# Patient Record
Sex: FEMALE | Race: BLACK OR AFRICAN AMERICAN | NOT HISPANIC OR LATINO | Employment: OTHER | URBAN - METROPOLITAN AREA
[De-identification: names, ages, dates, MRNs, and addresses within clinical notes are randomized per-mention and may not be internally consistent; named-entity substitution may affect disease eponyms.]

---

## 2017-01-05 PROBLEM — T82.858A STENOSIS OF ARTERIOVENOUS DIALYSIS FISTULA: Status: ACTIVE | Noted: 2017-01-05

## 2017-01-09 DIAGNOSIS — N18.6 ESRD (END STAGE RENAL DISEASE): Primary | ICD-10-CM

## 2017-01-09 RX ORDER — HYDRALAZINE HYDROCHLORIDE 50 MG/1
50 TABLET, FILM COATED ORAL EVERY 8 HOURS
Qty: 90 TABLET | Refills: 11
Start: 2017-01-09 | End: 2018-01-09

## 2017-01-30 PROBLEM — R74.8 ELEVATED LIVER ENZYMES: Status: ACTIVE | Noted: 2017-01-30

## 2017-01-30 PROBLEM — Z16.21 VRE BACTEREMIA: Status: ACTIVE | Noted: 2017-01-30

## 2017-01-30 PROBLEM — R78.81 GRAM-NEGATIVE BACTEREMIA: Status: ACTIVE | Noted: 2017-01-30

## 2017-01-30 PROBLEM — R78.81 VRE BACTEREMIA: Status: ACTIVE | Noted: 2017-01-30

## 2017-01-30 PROBLEM — B95.2 VRE BACTEREMIA: Status: ACTIVE | Noted: 2017-01-30

## 2017-02-03 ENCOUNTER — TELEPHONE (OUTPATIENT)
Dept: GASTROENTEROLOGY | Facility: CLINIC | Age: 72
End: 2017-02-03

## 2017-02-03 ENCOUNTER — ANESTHESIA (OUTPATIENT)
Dept: ENDOSCOPY | Facility: HOSPITAL | Age: 72
End: 2017-02-03

## 2017-02-03 ENCOUNTER — ANESTHESIA EVENT (OUTPATIENT)
Dept: ENDOSCOPY | Facility: HOSPITAL | Age: 72
End: 2017-02-03

## 2017-02-03 ENCOUNTER — SURGERY (OUTPATIENT)
Age: 72
End: 2017-02-03

## 2017-02-03 DIAGNOSIS — K62.5 RECTAL BLEEDING: Primary | ICD-10-CM

## 2017-02-03 PROCEDURE — 63600175 PHARM REV CODE 636 W HCPCS: Performed by: NURSE ANESTHETIST, CERTIFIED REGISTERED

## 2017-02-03 PROCEDURE — 25000003 PHARM REV CODE 250: Performed by: NURSE ANESTHETIST, CERTIFIED REGISTERED

## 2017-02-03 RX ORDER — PHENYLEPHRINE HYDROCHLORIDE 10 MG/ML
INJECTION INTRAVENOUS
Status: DISCONTINUED | OUTPATIENT
Start: 2017-02-03 | End: 2017-02-03

## 2017-02-03 RX ORDER — PROPOFOL 10 MG/ML
VIAL (ML) INTRAVENOUS
Status: DISCONTINUED | OUTPATIENT
Start: 2017-02-03 | End: 2017-02-03

## 2017-02-03 RX ORDER — LIDOCAINE HCL/PF 100 MG/5ML
SYRINGE (ML) INTRAVENOUS
Status: DISCONTINUED | OUTPATIENT
Start: 2017-02-03 | End: 2017-02-03

## 2017-02-03 RX ADMIN — LIDOCAINE HYDROCHLORIDE 100 MG: 20 INJECTION, SOLUTION INTRAVENOUS at 05:02

## 2017-02-03 RX ADMIN — PHENYLEPHRINE HYDROCHLORIDE 100 MCG: 10 INJECTION INTRAVENOUS at 05:02

## 2017-02-03 RX ADMIN — PROPOFOL 30 MG: 10 INJECTION, EMULSION INTRAVENOUS at 05:02

## 2017-02-03 NOTE — ANESTHESIA PREPROCEDURE EVALUATION
02/03/2017  Annabella Goodrich is a 71 y.o. female HTN, DM II, ESRD on MWF HD, status epilepticus, CVA, h/o bilateral DVTs/p IVC filter placement and asthma. Trach/peg in place. Receiving 2u pRBC today for Hgb 6.5     Last HD thursday 2/2/17      LDA:  PEG tube            8.0 shiley cuffed trach         [facility] Anesthesia Evaluation    I have reviewed the Patient Summary Reports.     I have reviewed the Medications.     Review of Systems  Anesthesia Hx:  No problems with previous Anesthesia Denies Hx of Anesthetic complications  History of prior surgery of interest to airway management or planning: Previous anesthesia: General Denies Family Hx of Anesthesia complications.   Denies Personal Hx of Anesthesia complications.   Social:  Former Smoker, No Alcohol Use    Hematology/Oncology:         -- Anemia:   EENT/Dental:   chronic allergic rhinitis   Cardiovascular:   Hypertension, poorly controlled Denies MI.  Denies CAD.    PVD H/o prolonged QT interval   Pulmonary:   Asthma mild    Renal/:   Chronic Renal Disease, ESRD, Dialysis    Hepatic/GI:  Hepatic/GI Normal    Neurological:   CVA, residual symptoms Seizures    Endocrine:   Diabetes, poorly controlled, type 2 Hypothyroidism        Physical Exam  General:  Malnutrition    Airway/Jaw/Neck:  Airway Findings: Pre-Existing Airway Tube(s): Tracheostomy tube General Airway Assessment: Adult       Chest/Lungs:  Chest/Lungs Findings: Clear to auscultation, Normal Respiratory Rate     Heart/Vascular:  Heart Findings: Rate: Normal  Rhythm: Regular Rhythm  Sounds: Normal           Lab Results   Component Value Date    WBC 12.27 02/03/2017    HGB 6.5 (L) 02/03/2017    HCT 22.1 (L) 02/03/2017     02/03/2017    CHOL 205 (H) 11/20/2016    TRIG 239 (H) 11/20/2016    HDL 56 11/20/2016    ALT 52 (H) 02/03/2017    AST 56 (H) 02/03/2017     (L) 02/03/2017    K 3.2 (L)  02/03/2017    CL 97 02/03/2017    CREATININE 2.0 (H) 02/03/2017    BUN 15 02/03/2017    CO2 20 (L) 02/03/2017    TSH 0.274 (L) 12/04/2016    INR 1.0 12/31/2016    HGBA1C 11.2 (H) 11/18/2016     Normal sinus rhythm  Left axis deviation  LVH with secondary ST-T wave changes  Abnormal ECG  When compared with ECG of 01-JAN-2017 05:38,  No significant change was found  Confirmed by Kate Winston MD ([id number]) on 2/1/2017 6:04:18 AM    Referred By: CAREY DE LEON           Confirmed By:Kate Winston MD    CONCLUSIONS ECHO    1 - Normal left ventricular systolic function (EF 55-60%).     2 - Left ventricular diastolic dysfunction.     3 - Moderate left atrial enlargement.     4 - Concentric hypertrophy.     5 - Normal right ventricular systolic function .   Calcified and sclerotic valves, but no vegetation seen.        This document has been electronically    SIGNED BY: Efrem Kingston MD On: 01/28/2017 10:20               Anesthesia Plan  Type of Anesthesia, risks & benefits discussed:  Anesthesia Type:  general, MAC  Patient's Preference:   Intra-op Monitoring Plan:   Intra-op Monitoring Plan Comments:   Post Op Pain Control Plan:   Post Op Pain Control Plan Comments:   Induction:   IV  Beta Blocker:  Patient is on a Beta-Blocker and has received one dose within the past 24 hours (No further documentation required).       Informed Consent: Patient representative understands risks and agrees with Anesthesia plan.  Questions answered. Anesthesia consent signed with patient representative.  ASA Score: 4     Day of Surgery Review of History & Physical:            Ready For Surgery From Anesthesia Perspective.

## 2017-02-03 NOTE — TRANSFER OF CARE
"Anesthesia Transfer of Care Note    Patient: Annabella Goodrich    Procedure(s) Performed: Procedure(s) (LRB):  SIGMOIDOSCOPY-FLEXIBLE- LTAC (N/A)  ESOPHAGOGASTRODUODENOSCOPY (EGD) (N/A)  COLONOSCOPY (N/A)    Patient location: PACU    Anesthesia Type: MAC    Transport from OR: Transported from OR on room air with adequate spontaneous ventilation    Post pain: adequate analgesia    Post assessment: no apparent anesthetic complications    Post vital signs: stable    Level of consciousness: awake and alert    Nausea/Vomiting: no nausea/vomiting    Complications: none          Last vitals:   Visit Vitals    BP (!) 108/43 (BP Location: Left leg, Patient Position: Lying, BP Method: Automatic)    Pulse 62    Temp 37.1 °C (98.7 °F) (Oral)    Resp 18    Ht 5' 3" (1.6 m)    Wt 65.3 kg (144 lb)    LMP  (LMP Unknown)    SpO2 99%    Breastfeeding No    BMI 25.51 kg/m2     "

## 2017-02-04 NOTE — ANESTHESIA POSTPROCEDURE EVALUATION
"Anesthesia Post Evaluation    Patient: Annabella Goodrich    Procedure(s) Performed: Procedure(s) (LRB):  SIGMOIDOSCOPY-FLEXIBLE- LTAC (N/A)  ESOPHAGOGASTRODUODENOSCOPY (EGD) (N/A)  COLONOSCOPY (N/A)    Final Anesthesia Type: MAC  Patient location during evaluation: PACU  Patient participation: Yes- Able to Participate  Level of consciousness: awake and alert  Post-procedure vital signs: reviewed and stable  Pain management: adequate  Airway patency: patent  PONV status at discharge: No PONV  Anesthetic complications: no      Cardiovascular status: hemodynamically stable  Respiratory status: unassisted  Hydration status: euvolemic  Follow-up not needed.        Visit Vitals    BP (!) 98/59    Pulse 71    Temp 36.9 °C (98.5 °F)    Resp 20    Ht 5' 3" (1.6 m)    Wt 65.3 kg (144 lb)    LMP  (LMP Unknown)    SpO2 98%    Breastfeeding No    BMI 25.51 kg/m2       Pain/Marialuisa Score: Pain Assessment Performed: Yes (2/3/2017  5:35 PM)  Presence of Pain: denies (2/3/2017  5:35 PM)      "

## 2017-02-09 PROBLEM — K80.12 CALCULUS OF GALLBLADDER WITH ACUTE ON CHRONIC CHOLECYSTITIS: Status: ACTIVE | Noted: 2017-01-30

## 2017-02-14 DIAGNOSIS — K92.2 GASTROINTESTINAL HEMORRHAGE, UNSPECIFIED GASTROINTESTINAL HEMORRHAGE TYPE: Primary | ICD-10-CM

## 2017-02-16 DIAGNOSIS — K80.50 CHOLEDOCHOLITHIASIS: Primary | ICD-10-CM

## 2017-02-16 DIAGNOSIS — R17 ELEVATED BILIRUBIN: ICD-10-CM

## 2017-02-16 DIAGNOSIS — K92.2 GASTROINTESTINAL HEMORRHAGE, UNSPECIFIED GASTROINTESTINAL HEMORRHAGE TYPE: ICD-10-CM

## 2017-02-17 ENCOUNTER — ANESTHESIA (OUTPATIENT)
Dept: ENDOSCOPY | Facility: HOSPITAL | Age: 72
End: 2017-02-17

## 2017-02-17 ENCOUNTER — ANESTHESIA EVENT (OUTPATIENT)
Dept: ENDOSCOPY | Facility: HOSPITAL | Age: 72
End: 2017-02-17

## 2017-02-17 ENCOUNTER — SURGERY (OUTPATIENT)
Age: 72
End: 2017-02-17

## 2017-02-17 PROCEDURE — 25000003 PHARM REV CODE 250: Performed by: NURSE ANESTHETIST, CERTIFIED REGISTERED

## 2017-02-17 PROCEDURE — 63600175 PHARM REV CODE 636 W HCPCS: Performed by: NURSE ANESTHETIST, CERTIFIED REGISTERED

## 2017-02-17 RX ORDER — ONDANSETRON 2 MG/ML
INJECTION INTRAMUSCULAR; INTRAVENOUS
Status: DISCONTINUED | OUTPATIENT
Start: 2017-02-17 | End: 2017-02-17

## 2017-02-17 RX ORDER — SODIUM CHLORIDE 9 MG/ML
INJECTION, SOLUTION INTRAVENOUS CONTINUOUS PRN
Status: DISCONTINUED | OUTPATIENT
Start: 2017-02-17 | End: 2017-02-17

## 2017-02-17 RX ADMIN — SODIUM CHLORIDE: 0.9 INJECTION, SOLUTION INTRAVENOUS at 04:02

## 2017-02-17 RX ADMIN — ONDANSETRON 8 MG: 2 INJECTION, SOLUTION INTRAMUSCULAR; INTRAVENOUS at 05:02

## 2017-02-17 NOTE — TRANSFER OF CARE
Anesthesia Transfer of Care Note    Patient: Annabella Goodrich    Procedure(s) Performed: Procedure(s) (LRB):  ERCP- LTAC  (N/A)  ESOPHAGOGASTRODUODENOSCOPY (EGD) with video capsule placement in the duodenum (N/A)    Patient location: PACU    Anesthesia Type: general    Transport from OR: Transported from OR on 6-10 L/min O2 by face mask with adequate spontaneous ventilation    Post pain: adequate analgesia    Post assessment: no apparent anesthetic complications and tolerated procedure well    Post vital signs: stable    Level of consciousness: awake and alert    Nausea/Vomiting: no nausea/vomiting    Complications: none          Last vitals:   Visit Vitals    BP (!) 187/76    Pulse 73    Temp 37.8 °C (100 °F) (Axillary)    Resp 16    LMP  (LMP Unknown)    SpO2 97%    Breastfeeding No

## 2017-02-17 NOTE — ANESTHESIA PREPROCEDURE EVALUATION
02/17/2017  Annabella Goodrich is a 71 y.o. female LTAC patient w hx GI bleedfor EGD & ERCP 2017-02-17    PRIOR ANES  2015-02-03 EGD MAC prop 30, phenyleph 100; [facility]  2016-12-19 Colonoscopy MAC prop [id number] [id number] (sbp 80-90) [facility]    ANES-RELATED MED/SURG 2017-02-17  HTN  DM II  ESRD on MWF HD  Hx status epilepticus  Hx CVA   - dementia (oriented to name, not place)  Hx  DVT (R+L)   - s/p IVC filter  Asthma  Trach present, but capped off; breathing on nasal cannula  PEG tube in place; needs re-positioning  Anemia   - s/p pRBC transfusion     ANES-RELATED MAR 2017-02-03 (See LTAC chart for update)  Amlodipine Albuterol Insulin-asart Pantoprazole keppra amantidine  Captopril  Carvedilol  hydralazine  ASA      [facility] Anesthesia Evaluation      I have reviewed the Medications.     Review of Systems  Anesthesia Hx:  No problems with previous Anesthesia Denies Hx of Anesthetic complications History of prior surgery of interest to airway management or planning: Previous anesthesia: General Denies Family Hx of Anesthesia complications.   Denies Personal Hx of Anesthesia complications.   Social:  Former Smoker, No Alcohol Use    Hematology/Oncology:         -- Anemia:   EENT/Dental:   chronic allergic rhinitis   Cardiovascular:   Hypertension, poorly controlled Denies MI.  Denies CAD.    PVD H/o prolonged QT interval   Pulmonary:   Asthma mild    Renal/:   Chronic Renal Disease, ESRD, Dialysis    Hepatic/GI:  Hepatic/GI Normal    Neurological:   CVA, residual symptoms Seizures    Endocrine:   Diabetes, poorly controlled, type 2 Hypothyroidism    latest V/S on LTAC chart    Physical Exam  General:  Malnutrition    Airway/Jaw/Neck:  Airway Findings: Pre-Existing Airway Tube(s): Tracheostomy tube General Airway Assessment: Adult       Chest/Lungs:  Chest/Lungs Findings: Clear to auscultation, Normal Respiratory Rate      Heart/Vascular:  Heart Findings: Rate: Normal  Rhythm: Regular Rhythm  Sounds: Normal           Lab Results   Component Value Date    WBC 13.43 (H) 02/17/2017    HGB 8.4 (L) 02/17/2017    HCT 25.4 (L) 02/17/2017    MCV 90 02/17/2017     02/17/2017       Chemistry        Component Value Date/Time     (L) 02/17/2017 0816    K 3.3 (L) 02/17/2017 0816    CL 98 02/17/2017 0816    CO2 24 02/17/2017 0816    BUN 23 02/17/2017 0816    CREATININE 1.8 (H) 02/17/2017 0816     (H) 02/17/2017 0816        Component Value Date/Time    CALCIUM 9.6 02/17/2017 0816    ALKPHOS 1090 (H) 02/10/2017 0454    AST 89 (H) 02/10/2017 0454    ALT 30 02/10/2017 0454    BILITOT 9.8 (H) 02/10/2017 0454        Lab Results   Component Value Date    ALBUMIN 1.7 (L) 02/16/2017    ALBUMIN 1.7 (L) 02/16/2017       CXR 2017-02-15  Cardiac silhouette is enlarged similar to prior exam.    Calcification of the aortic arch.    Tracheostomy noted, unchanged.    Interval increase in patchy interstitial lung markings scattered throughout the lungs bilaterally which may be seen with pulmonary edema or multifocal pneumonia.    No pleural effusion.  Osseous structures are unremarkable.    Right-sided catheter is visualized in the right upper extremity, with its tip terminating in the region of the right axillary vein. .    Stent in the left subclavian region.    EKG 2017-01-28  Normal sinus rhythm  Left axis deviation  LVH with secondary ST-T wave changes  Abnormal ECG  When compared with ECG of 01-JAN-2017 05:38,  No significant change was found  Confirmed by Catrachito    ECHO 2017-01-28    1 - Normal left ventricular systolic function (EF 55-60%).     2 - Left ventricular diastolic dysfunction.     3 - Moderate left atrial enlargement.     4 - Concentric hypertrophy.     5 - Normal right ventricular systolic function .   Calcified and sclerotic valves, but no vegetation seen.  This document has been electronically    SIGNED BY: Efrem  Thee       Anesthesia Plan  Type of Anesthesia, risks & benefits discussed:  Anesthesia Type:  general, MAC  Patient's Preference:   Intra-op Monitoring Plan:   Intra-op Monitoring Plan Comments:   Post Op Pain Control Plan:   Post Op Pain Control Plan Comments:   Induction:   IV  Beta Blocker:  Patient is on a Beta-Blocker and has received one dose within the past 24 hours (No further documentation required).       Informed Consent: Patient representative understands risks and agrees with Anesthesia plan.  Questions answered. Anesthesia consent signed with patient representative.  ASA Score: 4     Day of Surgery Review of History & Physical:        Anesthesia Plan Notes: 2017-02-17   - case was canceled 2d ago; re-booked for today   - family member with patient     ~ asks that PEG tube be repositioned during procedure (presently in wrong position)   - resp there today says more crackles in lungs than yesterday   - febrile 100.7   - today  systolic   - glucose today 247 @ 08:16 AM        Ready For Surgery From Anesthesia Perspective.

## 2017-02-18 NOTE — ANESTHESIA POSTPROCEDURE EVALUATION
Anesthesia Post Evaluation    Patient: Annabella Goodrich    Procedure(s) Performed: Procedure(s) (LRB):  ERCP- LTAC  (N/A)  ESOPHAGOGASTRODUODENOSCOPY (EGD) with video capsule placement in the duodenum (N/A)    Final Anesthesia Type: general  Patient location during evaluation: PACU  Patient participation: Yes- Able to Participate  Level of consciousness: awake and alert (at baseline)  Post-procedure vital signs: reviewed and stable  Pain management: adequate  Airway patency: patent  PONV status at discharge: No PONV  Anesthetic complications: no      Cardiovascular status: blood pressure returned to baseline and hemodynamically stable  Respiratory status: unassisted, spontaneous ventilation and room air  Hydration status: euvolemic  Follow-up not needed.        Visit Vitals    BP (!) 151/59    Pulse 66    Temp 37.2 °C (99 °F) (Oral)    Resp 20    LMP  (LMP Unknown)    SpO2 96%    Breastfeeding No       Pain/Marialuisa Score: Pain Assessment Performed: Yes (2/17/2017  5:40 PM)  Presence of Pain: denies (2/17/2017  6:10 PM)  Marialuisa Score: 8 (2/17/2017  6:10 PM)

## 2017-02-18 NOTE — ANESTHESIA RELEASE NOTE
Anesthesia Release from PACU Note    Patient: Annabella Goodrich    Procedure(s) Performed: Procedure(s) (LRB):  ERCP- LTAC  (N/A)  ESOPHAGOGASTRODUODENOSCOPY (EGD) with video capsule placement in the duodenum (N/A)    Anesthesia type: general    Post pain: Adequate analgesia    Post assessment: no apparent anesthetic complications    Last Vitals:   Visit Vitals    BP (!) 151/59    Pulse 66    Temp 37.2 °C (99 °F) (Oral)    Resp 20    LMP  (LMP Unknown)    SpO2 96%    Breastfeeding No       Post vital signs: stable    Level of consciousness: awake and alert  at baseline    Nausea/Vomiting: no nausea/no vomiting    Complications: none    Airway Patency: patent    Respiratory: unassisted, spontaneous ventilation, room air    Cardiovascular: stable and blood pressure at baseline    Hydration: euvolemic

## 2017-02-22 DIAGNOSIS — J18.9 PNEUMONIA: ICD-10-CM

## 2017-02-22 DIAGNOSIS — I77.0 A-V FISTULA: Primary | ICD-10-CM

## 2017-02-22 DIAGNOSIS — T82.898A AV FISTULA OCCLUSION: ICD-10-CM

## 2017-03-02 DIAGNOSIS — K94.23 MALFUNCTION OF PERCUTANEOUS ENDOSCOPIC GASTROSTOMY (PEG) TUBE: Primary | ICD-10-CM

## 2017-03-03 ENCOUNTER — ANESTHESIA (OUTPATIENT)
Dept: SURGERY | Facility: HOSPITAL | Age: 72
End: 2017-03-03

## 2017-03-03 ENCOUNTER — ANESTHESIA EVENT (OUTPATIENT)
Dept: ENDOSCOPY | Facility: HOSPITAL | Age: 72
End: 2017-03-03

## 2017-03-03 ENCOUNTER — SURGERY (OUTPATIENT)
Age: 72
End: 2017-03-03

## 2017-03-03 ENCOUNTER — ANESTHESIA EVENT (OUTPATIENT)
Dept: SURGERY | Facility: HOSPITAL | Age: 72
End: 2017-03-03

## 2017-03-03 ENCOUNTER — ANESTHESIA (OUTPATIENT)
Dept: ENDOSCOPY | Facility: HOSPITAL | Age: 72
End: 2017-03-03

## 2017-03-03 PROBLEM — L89.90 INFECTED DECUBITUS ULCER: Status: ACTIVE | Noted: 2017-03-03

## 2017-03-03 PROBLEM — L89.104 DECUBITUS ULCER OF BACK, STAGE 4: Status: ACTIVE | Noted: 2017-03-03

## 2017-03-03 PROBLEM — L08.9 INFECTED DECUBITUS ULCER: Status: ACTIVE | Noted: 2017-03-03

## 2017-03-03 LAB — PERIPHERAL STENOSIS: ABNORMAL

## 2017-03-03 PROCEDURE — 63600175 PHARM REV CODE 636 W HCPCS: Performed by: NURSE ANESTHETIST, CERTIFIED REGISTERED

## 2017-03-03 PROCEDURE — 25000003 PHARM REV CODE 250: Performed by: INTERNAL MEDICINE

## 2017-03-03 RX ORDER — CEFAZOLIN SODIUM 1 G/3ML
INJECTION, POWDER, FOR SOLUTION INTRAMUSCULAR; INTRAVENOUS
Status: DISCONTINUED | OUTPATIENT
Start: 2017-03-03 | End: 2017-03-03

## 2017-03-03 RX ORDER — PROPOFOL 10 MG/ML
VIAL (ML) INTRAVENOUS CONTINUOUS PRN
Status: DISCONTINUED | OUTPATIENT
Start: 2017-03-03 | End: 2017-03-03

## 2017-03-03 RX ORDER — PROPOFOL 10 MG/ML
VIAL (ML) INTRAVENOUS
Status: DISCONTINUED | OUTPATIENT
Start: 2017-03-03 | End: 2017-03-03

## 2017-03-03 RX ADMIN — PROPOFOL 50 MCG/KG/MIN: 10 INJECTION, EMULSION INTRAVENOUS at 03:03

## 2017-03-03 RX ADMIN — PROPOFOL 20 MG: 10 INJECTION, EMULSION INTRAVENOUS at 01:03

## 2017-03-03 RX ADMIN — PROPOFOL 10 MG: 10 INJECTION, EMULSION INTRAVENOUS at 01:03

## 2017-03-03 RX ADMIN — PROPOFOL 30 MG: 10 INJECTION, EMULSION INTRAVENOUS at 01:03

## 2017-03-03 RX ADMIN — BACITRACIN 50000 UNITS: 5000 INJECTION, POWDER, FOR SOLUTION INTRAMUSCULAR at 04:03

## 2017-03-03 RX ADMIN — SODIUM CHLORIDE: 0.9 INJECTION, SOLUTION INTRAVENOUS at 12:03

## 2017-03-03 RX ADMIN — LIDOCAINE HYDROCHLORIDE 20 ML: 10 INJECTION, SOLUTION EPIDURAL; INFILTRATION; INTRACAUDAL; PERINEURAL at 04:03

## 2017-03-03 RX ADMIN — CEFAZOLIN 1 G: 330 INJECTION, POWDER, FOR SOLUTION INTRAMUSCULAR; INTRAVENOUS at 03:03

## 2017-03-03 RX ADMIN — CEFAZOLIN 1 G: 330 INJECTION, POWDER, FOR SOLUTION INTRAMUSCULAR; INTRAVENOUS at 01:03

## 2017-03-03 NOTE — ANESTHESIA PREPROCEDURE EVALUATION
03/03/2017     Annabella Goodrich is a 71 y.o. female LTAC patient scheduled for debridement of sacral ulcer.  Had EGD with PEG placement under MAC earlier this afternoon with no issues.     There were no vitals filed for this visit.  PRIOR ANES in  Epic  2015-02-03 EGD MAC prop 30, phenyleph 100; [facility]  2016-12-19 Colonoscopy MAC prop [id number] VSS (sbp 80-90) [facility]    ANES-RELATED MED/SURG 2017-02-17  HTN  DM II  Carotid stenois, bilateral  ESRD on MWF HD  Hx status epilepticus  Hx CVA   - dementia (oriented to name, not place)  Hx seizure disorder  Hx  DVT (R+L)   - s/p IVC filter  Asthma  Trach present, but capped off; breathing on nasal cannula  PEG tube in place  Anemia   - s/p pRBC transfusion    Past Surgical History:   Procedure Laterality Date    AV FISTULA PLACEMENT      COLONOSCOPY N/A 12/19/2016    Procedure: COLONOSCOPY;  Surgeon: Andrew Duque MD;  Location: [facility] ([address] [place] [address]);  Service: Endoscopy;  Laterality: N/A;    COLONOSCOPY N/A 2/3/2017    Procedure: COLONOSCOPY;  Surgeon: Florencio Nunez Jr., MD;  Location: [facility];  Service: Endoscopy;  Laterality: N/A;    HERNIA REPAIR      HYSTERECTOMY      TRACHEOSTOMY TUBE PLACEMENT          ANES-RELATED Home Rx 2017-02-03  Amlodipine Albuterol Insulin-asart Pantoprazole keppra amantidine  Captopril  Carvedilol  hydralazine  ASA-81      [facility] Anesthesia Evaluation    I have reviewed the Patient Summary Reports.    I have reviewed the Nursing Notes.   I have reviewed the Medications.     Review of Systems  Anesthesia Hx:  No problems with previous Anesthesia Denies Hx of Anesthetic complications  History of prior surgery of interest to airway management or planning: Previous anesthesia: General Denies Family Hx of Anesthesia complications.   Denies Personal Hx of Anesthesia complications.   Social:  Former Smoker, No Alcohol Use     Hematology/Oncology:         -- Anemia:   EENT/Dental:   chronic allergic rhinitis   Cardiovascular:   Hypertension, poorly controlled Denies MI.  Denies CAD.    PVD H/o prolonged QT interval   Pulmonary:   Asthma mild    Renal/:   Chronic Renal Disease, ESRD, Dialysis    Hepatic/GI:  Hepatic/GI Normal    Neurological:   CVA, residual symptoms Seizures    Endocrine:   Diabetes, poorly controlled, type 2 Hypothyroidism      Wt Readings from Last 3 Encounters:   02/03/17 65.3 kg (144 lb)   12/29/16 70 kg (154 lb 5.2 oz)   11/19/16 65.5 kg (144 lb 6.4 oz)     Temp Readings from Last 3 Encounters:   03/02/17 36.8 °C (98.2 °F) (Axillary)   03/03/17 36.7 °C (98.1 °F) (Axillary)   03/03/17 37.2 °C (99 °F) (Oral)     BP Readings from Last 3 Encounters:   03/02/17 138/67   03/03/17 (!) 144/65   03/03/17 (!) 170/73     Pulse Readings from Last 3 Encounters:   03/02/17 (!) 58   03/03/17 62   03/03/17 63       Physical Exam  General:  Malnutrition    Airway/Jaw/Neck:  Airway Findings: Pre-Existing Airway Tube(s): Tracheostomy tube General Airway Assessment: Adult       Chest/Lungs:  Chest/Lungs Findings: Clear to auscultation, Normal Respiratory Rate     Heart/Vascular:  Heart Findings: Rate: Normal  Rhythm: Regular Rhythm  Sounds: Normal           Lab Results   Component Value Date    WBC 17.59 (H) 03/02/2017    HGB 9.1 (L) 03/02/2017    HCT 29.2 (L) 03/02/2017    MCV 94 03/02/2017     03/02/2017       Chemistry        Component Value Date/Time     (L) 03/02/2017 0500     (L) 03/02/2017 0500    K 4.7 03/02/2017 0500    K 4.7 03/02/2017 0500    CL 87 (L) 03/02/2017 0500    CL 87 (L) 03/02/2017 0500    CO2 24 03/02/2017 0500    CO2 24 03/02/2017 0500    BUN 62 (H) 03/02/2017 0500    BUN 62 (H) 03/02/2017 0500    CREATININE 2.6 (H) 03/02/2017 0500    CREATININE 2.6 (H) 03/02/2017 0500     (HH) 03/02/2017 0500     () 03/02/2017 0500        Component Value Date/Time    CALCIUM 11.3 (H)  03/02/2017 0500    CALCIUM 11.3 (H) 03/02/2017 0500    ALKPHOS 1090 (H) 02/10/2017 0454    AST 89 (H) 02/10/2017 0454    ALT 30 02/10/2017 0454    BILITOT 9.8 (H) 02/10/2017 0454        Lab Results   Component Value Date    ALBUMIN 1.4 (L) 03/02/2017    ALBUMIN 1.4 (L) 03/02/2017       CXR 2017-02-15  Cardiac silhouette is enlarged similar to prior exam.    Calcification of the aortic arch.    Tracheostomy noted, unchanged.    Interval increase in patchy interstitial lung markings scattered throughout the lungs bilaterally which may be seen with pulmonary edema or multifocal pneumonia.    No pleural effusion.  Osseous structures are unremarkable.    Right-sided catheter is visualized in the right upper extremity, with its tip terminating in the region of the right axillary vein. .    Stent in the left subclavian region.    EKG 2017-01-28  Normal sinus rhythm  Left axis deviation  LVH with secondary ST-T wave changes  Abnormal ECG  When compared with ECG of 01-JAN-2017 05:38,  No significant change was found  Confirmed by Catrachito    ECHO 2017-01-28    1 - Normal left ventricular systolic function (EF 55-60%).     2 - Left ventricular diastolic dysfunction.     3 - Moderate left atrial enlargement.     4 - Concentric hypertrophy.     5 - Normal right ventricular systolic function .   Calcified and sclerotic valves, but no vegetation seen.  This document has been electronically    SIGNED BY: Efrem Kingston       Anesthesia Plan  Type of Anesthesia, risks & benefits discussed:  Anesthesia Type:  general, MAC  Patient's Preference:   Intra-op Monitoring Plan:   Intra-op Monitoring Plan Comments:   Post Op Pain Control Plan:   Post Op Pain Control Plan Comments:   Induction:   IV  Beta Blocker:  Patient is on a Beta-Blocker and has received one dose within the past 24 hours (No further documentation required).       Informed Consent: Patient representative understands risks and agrees with Anesthesia plan.  Questions  answered. Anesthesia consent signed with patient representative.  ASA Score: 4     Day of Surgery Review of History & Physical: I have interviewed and examined the patient. I have reviewed the patient's H&P dated:  There are no significant changes.      Anesthesia Plan Notes: Phone consent obtained with patient's daughter Melodie Borges.         Ready For Surgery From Anesthesia Perspective.

## 2017-03-03 NOTE — ANESTHESIA POSTPROCEDURE EVALUATION
Anesthesia Post Evaluation    Patient: Annabella Goodrich    Procedure(s) Performed: Procedure(s) (LRB):  DEBRIDEMENT-WOUND SACRAL (N/A)    Final Anesthesia Type: MAC  Patient location during evaluation: PACU  Patient participation: Yes- Able to Participate  Level of consciousness: awake and alert  Post-procedure vital signs: reviewed and stable  Pain management: adequate  Airway patency: patent  PONV status at discharge: No PONV  Anesthetic complications: no      Cardiovascular status: blood pressure returned to baseline and hemodynamically stable  Respiratory status: unassisted, spontaneous ventilation and room air  Hydration status: euvolemic  Follow-up not needed.        Visit Vitals    BP (!) 155/72    Pulse 64    Temp 36.7 °C (98 °F) (Oral)    Resp (!) 21    LMP  (LMP Unknown)    SpO2 100%       Pain/Marialuisa Score: Pain Assessment Performed: Yes (3/3/2017  4:50 PM)  Presence of Pain: denies (3/3/2017  4:50 PM)  Marialuisa Score: 8 (3/3/2017  4:50 PM)

## 2017-03-03 NOTE — ANESTHESIA RELEASE NOTE
Anesthesia Release from PACU Note    Patient: Annabella Goodrich    Procedure(s) Performed: Procedure(s) (LRB):  ESOPHAGOGASTRODUODENOSCOPY (EGD) with PEG- LTAC (N/A)    Anesthesia type: MAC    Post pain: Adequate analgesia    Post assessment: no apparent anesthetic complications, tolerated procedure well and no evidence of recall    Last Vitals:   Visit Vitals    BP (!) 144/65    Pulse 62    Temp 36.7 °C (98.1 °F) (Axillary)    Resp (!) 21    LMP  (LMP Unknown)    SpO2 99%       Post vital signs: stable    Level of consciousness: awake, alert  and oriented    Nausea/Vomiting: no nausea/no vomiting    Complications: none    Airway Patency: patent    Respiratory: unassisted    Cardiovascular: stable and blood pressure at baseline    Hydration: euvolemic

## 2017-03-03 NOTE — ANESTHESIA POSTPROCEDURE EVALUATION
Anesthesia Post Evaluation    Patient: Annabella Goodrich    Procedure(s) Performed: Procedure(s) (LRB):  ESOPHAGOGASTRODUODENOSCOPY (EGD) with PEG- LTAC (N/A)    Final Anesthesia Type: MAC  Patient location during evaluation: PACU  Patient participation: Yes- Able to Participate  Level of consciousness: awake and alert  Post-procedure vital signs: reviewed and stable  Pain management: adequate  Airway patency: patent  PONV status at discharge: No PONV  Anesthetic complications: no      Cardiovascular status: hemodynamically stable  Respiratory status: unassisted  Hydration status: euvolemic  Follow-up not needed.        Visit Vitals    BP (!) 144/65    Pulse 62    Temp 36.7 °C (98.1 °F) (Axillary)    Resp (!) 21    LMP  (LMP Unknown)    SpO2 99%       Pain/Marialuisa Score: Pain Assessment Performed: Yes (3/3/2017  2:12 PM)  Presence of Pain: non-verbal indicators absent (3/3/2017  2:12 PM)  Marialuisa Score: 9 (3/3/2017  2:12 PM)

## 2017-03-03 NOTE — ANESTHESIA RELEASE NOTE
Anesthesia Release from PACU Note    Patient: Annabella Goodrich    Procedure(s) Performed: Procedure(s) (LRB):  DEBRIDEMENT-WOUND SACRAL (N/A)    Anesthesia type: MAC    Post pain: Adequate analgesia    Post assessment: no apparent anesthetic complications    Last Vitals:   Visit Vitals    BP (!) 155/72    Pulse 64    Temp 36.7 °C (98 °F) (Oral)    Resp (!) 21    LMP  (LMP Unknown)    SpO2 100%       Post vital signs: stable    Level of consciousness: awake and alert at baseline    Nausea/Vomiting: no nausea/no vomiting    Complications: none    Airway Patency: patent    Respiratory: unassisted, spontaneous ventilation, room air    Cardiovascular: stable and blood pressure at baseline    Hydration: euvolemic

## 2017-03-03 NOTE — TRANSFER OF CARE
Anesthesia Transfer of Care Note    Patient: Annabella Goodrich    Procedure(s) Performed: Procedure(s) (LRB):  DEBRIDEMENT-WOUND SACRAL (N/A)    Patient location: PACU    Anesthesia Type: MAC    Transport from OR: Transported from OR on room air with adequate spontaneous ventilation    Post pain: adequate analgesia    Post assessment: no apparent anesthetic complications    Post vital signs: stable    Level of consciousness: responds to stimulation and sedated    Nausea/Vomiting: no nausea/vomiting    Complications: none          Last vitals:   Visit Vitals    LMP  (LMP Unknown)

## 2017-03-03 NOTE — TRANSFER OF CARE
Anesthesia Transfer of Care Note    Patient: Annabella Goodrich    Procedure(s) Performed: Procedure(s) (LRB):  ESOPHAGOGASTRODUODENOSCOPY (EGD) with PEG- LTAC (N/A)    Patient location: PACU    Anesthesia Type: MAC    Transport from OR: Transported from OR on room air with adequate spontaneous ventilation    Post pain: adequate analgesia    Post assessment: no apparent anesthetic complications    Post vital signs: stable    Level of consciousness: responds to stimulation and sedated    Nausea/Vomiting: no nausea/vomiting    Complications: none          Last vitals:   Visit Vitals    BP (!) 170/73 (BP Location: Right arm, Patient Position: Lying, BP Method: Automatic)    Pulse 63    Temp 37.2 °C (99 °F) (Oral)    Resp 18    LMP  (LMP Unknown)    SpO2 99%    Breastfeeding No

## 2017-03-03 NOTE — ANESTHESIA PREPROCEDURE EVALUATION
03/03/2017     Annabella Goodrich is a 71 y.o. female LTAC patient w hx GI bleedfor EGD & ERCP 2017-02-17    Vitals:    03/03/17 1206   BP: (!) 170/73   Pulse: 63   Resp: 18   Temp: 37.2 °C (99 °F)     PRIOR ANES in  Epic  2015-02-03 EGD MAC prop 30, phenyleph 100; [facility]  2016-12-19 Colonoscopy MAC prop [id number] VSS (sbp 80-90) [facility]    ANES-RELATED MED/SURG 2017-02-17  HTN  DM II  Carotid stenois, bilateral  ESRD on MWF HD  Hx status epilepticus  Hx CVA   - dementia (oriented to name, not place)  Hx seizure disorder  Hx  DVT (R+L)   - s/p IVC filter  Asthma  Trach present, but capped off; breathing on nasal cannula  PEG tube in place  Anemia   - s/p pRBC transfusion    Past Surgical History:   Procedure Laterality Date    AV FISTULA PLACEMENT      COLONOSCOPY N/A 12/19/2016    Procedure: COLONOSCOPY;  Surgeon: Andrew Duque MD;  Location: [facility] ([address] [place] [address]);  Service: Endoscopy;  Laterality: N/A;    COLONOSCOPY N/A 2/3/2017    Procedure: COLONOSCOPY;  Surgeon: Florecnio Nunez Jr., MD;  Location: [facility];  Service: Endoscopy;  Laterality: N/A;    HERNIA REPAIR      HYSTERECTOMY      TRACHEOSTOMY TUBE PLACEMENT          ANES-RELATED Home Rx 2017-02-03  Amlodipine Albuterol Insulin-asart Pantoprazole keppra amantidine  Captopril  Carvedilol  hydralazine  ASA-81      [facility] Anesthesia Evaluation    I have reviewed the Patient Summary Reports.    I have reviewed the Nursing Notes.   I have reviewed the Medications.     Review of Systems  Anesthesia Hx:  No problems with previous Anesthesia Denies Hx of Anesthetic complications  History of prior surgery of interest to airway management or planning: Previous anesthesia: General Denies Family Hx of Anesthesia complications.   Denies Personal Hx of Anesthesia complications.   Social:  Former Smoker, No Alcohol Use    Hematology/Oncology:         -- Anemia:    EENT/Dental:   chronic allergic rhinitis   Cardiovascular:   Hypertension, poorly controlled Denies MI.  Denies CAD.    PVD H/o prolonged QT interval   Pulmonary:   Asthma mild    Renal/:   Chronic Renal Disease, ESRD, Dialysis    Hepatic/GI:  Hepatic/GI Normal    Neurological:   CVA, residual symptoms Seizures    Endocrine:   Diabetes, poorly controlled, type 2 Hypothyroidism      Wt Readings from Last 3 Encounters:   02/03/17 65.3 kg (144 lb)   12/29/16 70 kg (154 lb 5.2 oz)   11/19/16 65.5 kg (144 lb 6.4 oz)     Temp Readings from Last 3 Encounters:   03/02/17 36.8 °C (98.2 °F) (Axillary)   02/17/17 37.2 °C (99 °F) (Oral)   02/03/17 36.9 °C (98.5 °F)     BP Readings from Last 3 Encounters:   03/02/17 138/67   02/17/17 (!) 151/59   02/03/17 (!) 98/59     Pulse Readings from Last 3 Encounters:   03/02/17 (!) 58   02/17/17 66   02/03/17 71       Physical Exam  General:  Malnutrition    Airway/Jaw/Neck:  Airway Findings: Pre-Existing Airway Tube(s): Tracheostomy tube General Airway Assessment: Adult       Chest/Lungs:  Chest/Lungs Findings: Clear to auscultation, Normal Respiratory Rate     Heart/Vascular:  Heart Findings: Rate: Normal  Rhythm: Regular Rhythm  Sounds: Normal           Lab Results   Component Value Date    WBC 17.59 (H) 03/02/2017    HGB 9.1 (L) 03/02/2017    HCT 29.2 (L) 03/02/2017    MCV 94 03/02/2017     03/02/2017       Chemistry        Component Value Date/Time     (L) 03/02/2017 0500     (L) 03/02/2017 0500    K 4.7 03/02/2017 0500    K 4.7 03/02/2017 0500    CL 87 (L) 03/02/2017 0500    CL 87 (L) 03/02/2017 0500    CO2 24 03/02/2017 0500    CO2 24 03/02/2017 0500    BUN 62 (H) 03/02/2017 0500    BUN 62 (H) 03/02/2017 0500    CREATININE 2.6 (H) 03/02/2017 0500    CREATININE 2.6 (H) 03/02/2017 0500     (HH) 03/02/2017 0500     (HH) 03/02/2017 0500        Component Value Date/Time    CALCIUM 11.3 (H) 03/02/2017 0500    CALCIUM 11.3 (H) 03/02/2017 0500     ALKPHOS 1090 (H) 02/10/2017 0454    AST 89 (H) 02/10/2017 0454    ALT 30 02/10/2017 0454    BILITOT 9.8 (H) 02/10/2017 0454        Lab Results   Component Value Date    ALBUMIN 1.4 (L) 03/02/2017    ALBUMIN 1.4 (L) 03/02/2017       CXR 2017-02-15  Cardiac silhouette is enlarged similar to prior exam.    Calcification of the aortic arch.    Tracheostomy noted, unchanged.    Interval increase in patchy interstitial lung markings scattered throughout the lungs bilaterally which may be seen with pulmonary edema or multifocal pneumonia.    No pleural effusion.  Osseous structures are unremarkable.    Right-sided catheter is visualized in the right upper extremity, with its tip terminating in the region of the right axillary vein. .    Stent in the left subclavian region.    EKG 2017-01-28  Normal sinus rhythm  Left axis deviation  LVH with secondary ST-T wave changes  Abnormal ECG  When compared with ECG of 01-JAN-2017 05:38,  No significant change was found  Confirmed by Catrachito    ECHO 2017-01-28    1 - Normal left ventricular systolic function (EF 55-60%).     2 - Left ventricular diastolic dysfunction.     3 - Moderate left atrial enlargement.     4 - Concentric hypertrophy.     5 - Normal right ventricular systolic function .   Calcified and sclerotic valves, but no vegetation seen.  This document has been electronically    SIGNED BY: Efrem Kingston       Anesthesia Plan  Type of Anesthesia, risks & benefits discussed:  Anesthesia Type:  general, MAC  Patient's Preference:   Intra-op Monitoring Plan:   Intra-op Monitoring Plan Comments:   Post Op Pain Control Plan:   Post Op Pain Control Plan Comments:   Induction:   IV  Beta Blocker:  Patient is on a Beta-Blocker and has received one dose within the past 24 hours (No further documentation required).       Informed Consent: Patient representative understands risks and agrees with Anesthesia plan.  Questions answered. Anesthesia consent signed with patient  representative.  ASA Score: 4     Day of Surgery Review of History & Physical: I have interviewed and examined the patient. I have reviewed the patient's H&P dated:  There are no significant changes.      Anesthesia Plan Notes: 2017-02-17   - case was canceled 2d ago; re-booked for today   - family member with patient     ~ asks that PEG tube be repositioned during procedure (presently in wrong position)   - resp there today says more crackles in lungs than yesterday   - febrile 100.7   - today  systolic   - glucose today 247 @ 08:16 AM        Ready For Surgery From Anesthesia Perspective.